# Patient Record
Sex: FEMALE | Race: BLACK OR AFRICAN AMERICAN | NOT HISPANIC OR LATINO | Employment: FULL TIME | URBAN - METROPOLITAN AREA
[De-identification: names, ages, dates, MRNs, and addresses within clinical notes are randomized per-mention and may not be internally consistent; named-entity substitution may affect disease eponyms.]

---

## 2023-12-22 ENCOUNTER — ANESTHESIA (OUTPATIENT)
Dept: GASTROENTEROLOGY | Facility: HOSPITAL | Age: 48
End: 2023-12-22
Payer: COMMERCIAL

## 2023-12-22 ENCOUNTER — ANESTHESIA EVENT (OUTPATIENT)
Dept: GASTROENTEROLOGY | Facility: HOSPITAL | Age: 48
End: 2023-12-22
Payer: COMMERCIAL

## 2023-12-22 ENCOUNTER — HOSPITAL ENCOUNTER (OUTPATIENT)
Facility: HOSPITAL | Age: 48
Setting detail: HOSPITAL OUTPATIENT SURGERY
Discharge: HOME | End: 2023-12-22
Attending: INTERNAL MEDICINE | Admitting: INTERNAL MEDICINE
Payer: COMMERCIAL

## 2023-12-22 VITALS
BODY MASS INDEX: 36.8 KG/M2 | DIASTOLIC BLOOD PRESSURE: 70 MMHG | OXYGEN SATURATION: 100 % | HEART RATE: 74 BPM | HEIGHT: 62 IN | WEIGHT: 200 LBS | SYSTOLIC BLOOD PRESSURE: 100 MMHG | RESPIRATION RATE: 16 BRPM

## 2023-12-22 PROCEDURE — 25010000002 PROPOFOL 10 MG/ML EMULSION: Performed by: NURSE ANESTHETIST, CERTIFIED REGISTERED

## 2023-12-22 PROCEDURE — 25810000003 LACTATED RINGERS PER 1000 ML: Performed by: INTERNAL MEDICINE

## 2023-12-22 RX ORDER — LIDOCAINE HYDROCHLORIDE 20 MG/ML
INJECTION, SOLUTION INFILTRATION; PERINEURAL AS NEEDED
Status: DISCONTINUED | OUTPATIENT
Start: 2023-12-22 | End: 2023-12-22 | Stop reason: SURG

## 2023-12-22 RX ORDER — SODIUM CHLORIDE, SODIUM LACTATE, POTASSIUM CHLORIDE, CALCIUM CHLORIDE 600; 310; 30; 20 MG/100ML; MG/100ML; MG/100ML; MG/100ML
30 INJECTION, SOLUTION INTRAVENOUS CONTINUOUS PRN
Status: DISCONTINUED | OUTPATIENT
Start: 2023-12-22 | End: 2023-12-22 | Stop reason: HOSPADM

## 2023-12-22 RX ORDER — PROPOFOL 10 MG/ML
VIAL (ML) INTRAVENOUS AS NEEDED
Status: DISCONTINUED | OUTPATIENT
Start: 2023-12-22 | End: 2023-12-22 | Stop reason: SURG

## 2023-12-22 RX ADMIN — PROPOFOL 140 MCG/KG/MIN: 10 INJECTION, EMULSION INTRAVENOUS at 09:02

## 2023-12-22 RX ADMIN — SODIUM CHLORIDE, POTASSIUM CHLORIDE, SODIUM LACTATE AND CALCIUM CHLORIDE 30 ML/HR: 600; 310; 30; 20 INJECTION, SOLUTION INTRAVENOUS at 08:58

## 2023-12-22 RX ADMIN — LIDOCAINE HYDROCHLORIDE 60 MG: 20 INJECTION, SOLUTION INFILTRATION; PERINEURAL at 09:02

## 2023-12-22 RX ADMIN — PROPOFOL 100 MG: 10 INJECTION, EMULSION INTRAVENOUS at 09:02

## 2023-12-22 NOTE — ANESTHESIA PREPROCEDURE EVALUATION
Anesthesia Evaluation     NPO Solid Status: > 8 hours  NPO Liquid Status: > 8 hours           Airway   Mallampati: I  No difficulty expected  Dental      Pulmonary    Cardiovascular     (+) hyperlipidemia      Neuro/Psych  (+) psychiatric history  GI/Hepatic/Renal/Endo    (+) GERD, liver disease    Musculoskeletal     Abdominal    Substance History      OB/GYN          Other                    Anesthesia Plan    ASA 2     MAC     intravenous induction     Anesthetic plan, risks, benefits, and alternatives have been provided, discussed and informed consent has been obtained with: patient.    CODE STATUS:

## 2023-12-22 NOTE — ANESTHESIA POSTPROCEDURE EVALUATION
Patient: Leonel Chawla    Procedure Summary       Date: 12/22/23 Room / Location:  [facility] /  [facility]    Anesthesia Start: 0859 Anesthesia Stop: 0923    Procedure: COLONOSCOPY into cecum Diagnosis:     Surgeons: Deandre Segundo MD Provider: Aldair Carey MD    Anesthesia Type: MAC ASA Status: 2            Anesthesia Type: MAC    Vitals  Vitals Value Taken Time   /70 12/22/23 0942   Temp     Pulse 74 12/22/23 0941   Resp 16 12/22/23 0941   SpO2 100 % 12/22/23 0941   Vitals shown include unfiled device data.        Post Anesthesia Care and Evaluation    Patient location during evaluation: PACU  Patient participation: complete - patient participated  Level of consciousness: awake  Pain management: adequate    Airway patency: patent  Anesthetic complications: No anesthetic complications  PONV Status: none  Cardiovascular status: acceptable  Respiratory status: acceptable  Hydration status: acceptable    Comments: Patient seen and examined postoperatively; vital signs stable; SpO2 greater than or equal to 90%; cardiopulmonary status stable; nausea/vomiting adequately controlled; pain adequately controlled; no apparent anesthesia complications; patient discharged from anesthesia care when discharge criteria were met

## 2023-12-22 NOTE — H&P
[facility]   HISTORY AND PHYSICAL    Patient Name: Leonel Chawla  : 1975  MRN: [id number]  Primary Care Physician:  Reyes, Rosenberg Acosta, MD  Date of admission: 2023    Subjective   Subjective     Chief Complaint: hx of colon polyps     History of Present Illness  The patient presents with no gastrointestinal  Symptoms or issues at this time   Review of Systems   All other systems reviewed and are negative.       Personal History     Past Medical History:   Diagnosis Date    ADHD (attention deficit hyperactivity disorder)     Allergic rhinitis     Bipolar 1 disorder     Depression     Gall stones     GERD (gastroesophageal reflux disease)     Hyperlipidemia     IBS (irritable bowel syndrome)     UTI (urinary tract infection)        Past Surgical History:   Procedure Laterality Date    CHOLECYSTECTOMY WITH INTRAOPERATIVE CHOLANGIOGRAM N/A 2017    Procedure: CHOLECYSTECTOMY LAPAROSCOPIC INTRAOPERATIVE CHOLANGIOGRAM;  Surgeon: Christos Goodrich Jr., MD;  Location: [facility] OR;  Service:     CYSTOSCOPY BLADDER STONE LITHOTRIPSY Left     2023    ENDOMETRIAL ABLATION      PA ERCP DX COLLECTION SPECIMEN BRUSHING/WASHING N/A 2017    Procedure: ENDOSCOPIC RETROGRADE CHOLANGIOPANCREATOGRAPHY WITH SPHINTEROTOMY AND BALLOON STONE EXTRACTION;  Surgeon: Stefano Gilliam MD;  Location: [facility] ENDOSCOPY;  Service: Gastroenterology    TUBAL ABDOMINAL LIGATION         Family History: family history includes Hyperlipidemia in her mother; Hypertension in her father; Hypothyroidism in her mother. Otherwise pertinent FHx was reviewed and not pertinent to current issue.    Social History:  reports that she has never smoked. She has never used smokeless tobacco. She reports current alcohol use of about 7.0 standard drinks of alcohol per week. She reports current drug use. Drug: Marijuana.    Home Medications:  HYDROcodone-acetaminophen, amphetamine-dextroamphetamine, bumetanide,  clonazePAM, cyclobenzaprine, ibuprofen, lamoTRIgine, and rosuvastatin    Allergies:  No Known Allergies    Objective    Objective     Vitals:   Heart Rate:  [78] 78  Resp:  [9] 9  BP: (105)/(88) 105/88    Physical Exam  HENT:      Right Ear: External ear normal.      Left Ear: External ear normal.      Mouth/Throat:      Pharynx: Oropharynx is clear.   Eyes:      Conjunctiva/sclera: Conjunctivae normal.   Cardiovascular:      Rate and Rhythm: Normal rate.      Pulses: Normal pulses.   Pulmonary:      Effort: Pulmonary effort is normal.   Abdominal:      General: Abdomen is flat.   Skin:     General: Skin is warm.   Neurological:      General: No focal deficit present.      Mental Status: She is alert.   Psychiatric:         Mood and Affect: Mood normal.         Result Review    Result Review:  I have personally reviewed the results from the time of this admission to 12/22/2023 09:04 EST and agree with these findings:  []  Laboratory list / accordion  []  Microbiology  []  Radiology  []  EKG/Telemetry   []  Cardiology/Vascular   []  Pathology  []  Old records  []  Other:  Most notable findings include:       Assessment & Plan   Assessment / Plan     Brief Patient Summary:  Leonel Chawla is a 48 y.o. female who   Personal history of colon polyps     Active Hospital Problems:  There are no active hospital problems to display for this patient.    Plan: Colonoscopy risks, alternatives and benefits discussed with patient and the patient is agreeable to having procedure done.      DVT prophylaxis:  No DVT prophylaxis order currently exists.    CODE STATUS:       Admission Status:  I believe this patient meets outpatient  status.    Deandre Segundo MD

## (undated) DEVICE — ADAPT CLN BIOGUARD AIR/H2O DISP

## (undated) DEVICE — CANN O2 ETCO2 FITS ALL CONN CO2 SMPL A/ 7IN DISP LF

## (undated) DEVICE — LN SMPL CO2 SHTRM SD STREAM W/M LUER

## (undated) DEVICE — TUBING, SUCTION, 1/4" X 10', STRAIGHT: Brand: MEDLINE

## (undated) DEVICE — KT ORCA ORCAPOD DISP STRL

## (undated) DEVICE — SENSR O2 OXIMAX FNGR A/ 18IN NONSTR